# Patient Record
(demographics unavailable — no encounter records)

---

## 2025-05-09 NOTE — HISTORY OF PRESENT ILLNESS
[de-identified] : 5/7/25: Here to f/up b/l knees. CSI and aspiration R knee last visit 7/25/24 (L knee CSI and aspiration 6/28/24) both with relief until 5/1/25. Knee pain returned with no new STORMY. Not in PT.   07/25/2024 Mr. SONYA SIMON, a 65 year old male, presents today for R knee pain since 7/11/24. States unknown STORMY, but plays pickleball 2-3 days/week. States pain is medial knee, and is associated with popping and cracking. Slight edema. Would also like to f/u L knee post CSI last visit. Reports improvement in L knee, but still getting bouts of crepitance as well.    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 Denies prior Mhx to either area.

## 2025-05-09 NOTE — PHYSICAL EXAM
[NL (0)] : extension 0 degrees [Negative] : negative Aracely's [Right] : right knee [NL (40)] : plantar flexion 40 degrees [NL 30)] : inversion 30 degrees [NL (20)] : eversion 20 degrees [5___] : eversion 5[unfilled]/5 [Left] : left foot and ankle [TWNoteComboBox7] : flexion 130 degrees [] : no achilles tendon tenderness [FreeTextEntry8] : calf is soft and compressible, no sign of dvt

## 2025-07-16 NOTE — DISCUSSION/SUMMARY
[de-identified] : 66m with acute exacerbation of b/l knee djd  Arthritis is a progressive problem that once occurs will be a chronic issue that will likely continue until surgical treatment is necessary. Treatment options for arthritis include OTC NSAIDS, prescription NSAIDS, ice, bracing, physical therapy, cortisone and/or visco injections, and surgery for joint replacement.  discussed availability of visco injections and pt would like to proceed. will request auth for a series of injections. Euflexxa #1 Injection tolerated well. Post injection instructions reviewed. 1) wbat, cryotherapy 2) rtc 1 week to continue series  Aspiration b/l knees today - 25cc L / 4cc R

## 2025-07-16 NOTE — IMAGING
[Bilateral] : knee bilaterally [FreeTextEntry9] : 4 views (AP, Lateral, Christoval and Tunnel) obtained and interpreted on 07/16/2025. Degenerative changes.

## 2025-07-16 NOTE — HISTORY OF PRESENT ILLNESS
[de-identified] : 07/16/2025: Here to f/up for B/L knee pain. CSI B/L last visit, 05/07/25, with slight relief. Pt states knee pain returned a few weeks ago. L>K pain. Pt went to PT at [facility] for 2 weeks but did not see improvement.   5/7/25: Here to f/up b/l knees. CSI and aspiration R knee last visit 7/25/24 (L knee CSI and aspiration 6/28/24) both with relief until 5/1/25. Knee pain returned with no new STORMY. Not in PT.  07/25/2024 Mr. SONYA SIMON, a 65 year old male, presents today for R knee pain since 7/11/24. States unknown STORMY, but plays pickleball 2-3 days/week. States pain is medial knee, and is associated with popping and cracking. Slight edema. Would also like to f/u L knee post CSI last visit. Reports improvement in L knee, but still getting bouts of crepitance as well.   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 Denies prior Mhx to either area.

## 2025-07-16 NOTE — PROCEDURE
[FreeTextEntry3] : Aspiration  was performed of the b/l knee. Aspiration was indicated due to effusion. The amount aspirated was  25cc L / 4cc R. The amount aspirated was clear nsf. Patient tolerated procedure well. Large joint injection was performed  of the b/l knees. The indication for this procedure was x-ray evidence of Osteoarthritis on this or prior visit. The site was prepped with alcohol and betadine. An injection of Lidocaine 3cc of 1% , Euflexxa 20mg, # [1 ] was used.   Patient was advised to call if redness, pain or fever occur and apply ice for 15 minutes out of every hour for the next 12-24 hours as tolerated.   Patient has tried OTC's including aspirin, Ibuprofen, Aleve, etc or prescription NSAIDS, and/or exercises at home and/or physical therapy without satisfactory response, patient had decreased mobility in the joint and the risks benefits, and alternatives have been discussed, and verbal consent was obtained.   The risks, benefits and contents of the injection have been discussed.  Risks include but are not limited to allergic reaction, flare reaction, permanent white skin discoloration at the injection site and infection.  The patient understands the risks and agrees to having the injection.  All questions have been answered.   Ultrasound guidance was indicated for this patient due to prior failure or difficult injection.

## 2025-07-16 NOTE — PHYSICAL EXAM
[Left] : left knee [NL (0)] : extension 0 degrees [5___] : hamstring 5[unfilled]/5 [Negative] : negative Aracely's [Right] : right knee [] : no erythema [TWNoteComboBox7] : flexion 130 degrees

## 2025-07-23 NOTE — PROCEDURE
[FreeTextEntry3] : Aspiration  was performed of the b/l knee. Aspiration was indicated due to effusion. The amount aspirated was 50cc left / 15cc right . The amount aspirated was clear nsf. Patient tolerated procedure well. Large joint injection was performed  of the b/l knees. The indication for this procedure was x-ray evidence of Osteoarthritis on this or prior visit. The site was prepped with alcohol and betadine. An injection of Lidocaine 3cc of 1% , Euflexxa 20mg, # [2 ] was used.   Patient was advised to call if redness, pain or fever occur and apply ice for 15 minutes out of every hour for the next 12-24 hours as tolerated.   Patient has tried OTC's including aspirin, Ibuprofen, Aleve, etc or prescription NSAIDS, and/or exercises at home and/or physical therapy without satisfactory response, patient had decreased mobility in the joint and the risks benefits, and alternatives have been discussed, and verbal consent was obtained.   The risks, benefits and contents of the injection have been discussed.  Risks include but are not limited to allergic reaction, flare reaction, permanent white skin discoloration at the injection site and infection.  The patient understands the risks and agrees to having the injection.  All questions have been answered.   Ultrasound guidance was indicated for this patient due to prior failure or difficult injection.

## 2025-07-23 NOTE — HISTORY OF PRESENT ILLNESS
[de-identified] : 07/23/25: Here for Euflexxa#2. Pt reports decrease in pain since beginning Euflexxa Series.   07/16/2025: Here to f/up for B/L knee pain. CSI B/L last visit, 05/07/25, with slight relief. Pt states knee pain returned a few weeks ago. L>K pain. Pt went to PT at [facility] for 2 weeks but did not see improvement.  5/7/25: Here to f/up b/l knees. CSI and aspiration R knee last visit 7/25/24 (L knee CSI and aspiration 6/28/24) both with relief until 5/1/25. Knee pain returned with no new STORMY. Not in PT.  07/25/2024 Mr. SONYA SIMON, a 65 year old male, presents today for R knee pain since 7/11/24. States unknown STORMY, but plays pickleball 2-3 days/week. States pain is medial knee, and is associated with popping and cracking. Slight edema. Would also like to f/u L knee post CSI last visit. Reports improvement in L knee, but still getting bouts of crepitance as well.   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 Denies prior Mhx to either area.

## 2025-07-23 NOTE — DISCUSSION/SUMMARY
[de-identified] : 66m with acute exacerbation of b/l knee djd  Arthritis is a progressive problem that once occurs will be a chronic issue that will likely continue until surgical treatment is necessary. Treatment options for arthritis include OTC NSAIDS, prescription NSAIDS, ice, bracing, physical therapy, cortisone and/or visco injections, and surgery for joint replacement.  discussed availability of visco injections and pt would like to proceed. will request auth for a series of injections. Euflexxa #2 Injection tolerated well. Post injection instructions reviewed.  1) wbat, cryotherapy 2) rtc 1 week to continue series  Aspiration b/l knees today - 50cc left / 15cc right   Entered by Irena Rust acting as scribe. Dr. Kumar- The documentation recorded by the scribe accurately reflects the service I personally performed and the decisions made by me.

## 2025-07-30 NOTE — HISTORY OF PRESENT ILLNESS
[de-identified] : 7/30/25: Here to f/up bilateral knees and receive b/l Euflexxa injection #3. Pt reports decrease in pain since beginning Euflexxa Gel Series.  07/23/25: Here for Euflexxa#2. Pt reports decrease in pain since beginning Euflexxa Series.  07/16/2025: Here to f/up for B/L knee pain. CSI B/L last visit, 05/07/25, with slight relief. Pt states knee pain returned a few weeks ago. L>K pain. Pt went to PT at [facility] for 2 weeks but did not see improvement.  5/7/25: Here to f/up b/l knees. CSI and aspiration R knee last visit 7/25/24 (L knee CSI and aspiration 6/28/24) both with relief until 5/1/25. Knee pain returned with no new STORMY. Not in PT.  07/25/2024 Mr. SONYA SIMON, a 65 year old male, presents today for R knee pain since 7/11/24. States unknown STORMY, but plays pickleball 2-3 days/week. States pain is medial knee, and is associated with popping and cracking. Slight edema. Would also like to f/u L knee post CSI last visit. Reports improvement in L knee, but still getting bouts of crepitance as well.   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 Denies prior Mhx to either area.

## 2025-07-30 NOTE — DISCUSSION/SUMMARY
[de-identified] : 66m with acute exacerbation of b/l knee djd  Arthritis is a progressive problem that once occurs will be a chronic issue that will likely continue until surgical treatment is necessary. Treatment options for arthritis include OTC NSAIDS, prescription NSAIDS, ice, bracing, physical therapy, cortisone and/or visco injections, and surgery for joint replacement.  discussed availability of visco injections and pt would like to proceed. will request auth for a series of injections. Euflexxa #3 Injection tolerated well. Post injection instructions reviewed.  1) wbat, cryotherapy 2) Resume PT as tolerated 3) rtc 6 week to re-eval  Aspiration L knee today - 50cc   Entered by Irena Rust acting as scribe. Dr. Kumar- The documentation recorded by the scribe accurately reflects the service I personally performed and the decisions made by me.

## 2025-07-30 NOTE — PROCEDURE
[FreeTextEntry3] : Aspiration  was performed of the left knee. Aspiration was indicated due to effusion. The amount aspirated was 50cc. The amount aspirated was clear nsf. Patient tolerated procedure well. Large joint injection was performed  of the b/l knees. The indication for this procedure was x-ray evidence of Osteoarthritis on this or prior visit. The site was prepped with alcohol and betadine. An injection of Lidocaine 3cc of 1% , Euflexxa 20mg, # [3 ] was used.   Patient was advised to call if redness, pain or fever occur and apply ice for 15 minutes out of every hour for the next 12-24 hours as tolerated.   Patient has tried OTC's including aspirin, Ibuprofen, Aleve, etc or prescription NSAIDS, and/or exercises at home and/or physical therapy without satisfactory response, patient had decreased mobility in the joint and the risks benefits, and alternatives have been discussed, and verbal consent was obtained.   The risks, benefits and contents of the injection have been discussed.  Risks include but are not limited to allergic reaction, flare reaction, permanent white skin discoloration at the injection site and infection.  The patient understands the risks and agrees to having the injection.  All questions have been answered.